# Patient Record
Sex: MALE | Race: WHITE | Employment: OTHER | URBAN - NONMETROPOLITAN AREA
[De-identification: names, ages, dates, MRNs, and addresses within clinical notes are randomized per-mention and may not be internally consistent; named-entity substitution may affect disease eponyms.]

---

## 2017-11-30 ENCOUNTER — OFFICE VISIT (OUTPATIENT)
Dept: PULMONOLOGY | Age: 77
End: 2017-11-30
Payer: MEDICARE

## 2017-11-30 VITALS
OXYGEN SATURATION: 98 % | SYSTOLIC BLOOD PRESSURE: 112 MMHG | HEIGHT: 70 IN | BODY MASS INDEX: 39.03 KG/M2 | HEART RATE: 78 BPM | DIASTOLIC BLOOD PRESSURE: 70 MMHG | WEIGHT: 272.6 LBS

## 2017-11-30 DIAGNOSIS — Z99.89 OBSTRUCTIVE SLEEP APNEA ON CPAP: Primary | ICD-10-CM

## 2017-11-30 DIAGNOSIS — G47.33 OBSTRUCTIVE SLEEP APNEA ON CPAP: Primary | ICD-10-CM

## 2017-11-30 PROCEDURE — 99213 OFFICE O/P EST LOW 20 MIN: CPT | Performed by: PHYSICIAN ASSISTANT

## 2017-11-30 PROCEDURE — 1123F ACP DISCUSS/DSCN MKR DOCD: CPT | Performed by: PHYSICIAN ASSISTANT

## 2017-11-30 PROCEDURE — G8427 DOCREV CUR MEDS BY ELIG CLIN: HCPCS | Performed by: PHYSICIAN ASSISTANT

## 2017-11-30 PROCEDURE — G8484 FLU IMMUNIZE NO ADMIN: HCPCS | Performed by: PHYSICIAN ASSISTANT

## 2017-11-30 PROCEDURE — 4040F PNEUMOC VAC/ADMIN/RCVD: CPT | Performed by: PHYSICIAN ASSISTANT

## 2017-11-30 PROCEDURE — G8417 CALC BMI ABV UP PARAM F/U: HCPCS | Performed by: PHYSICIAN ASSISTANT

## 2017-11-30 PROCEDURE — 1036F TOBACCO NON-USER: CPT | Performed by: PHYSICIAN ASSISTANT

## 2017-11-30 PROCEDURE — 3017F COLORECTAL CA SCREEN DOC REV: CPT | Performed by: PHYSICIAN ASSISTANT

## 2017-11-30 NOTE — PROGRESS NOTES
[place] [facility]      Dina Olvera                                         [id number]  11/30/2017   Chief Complaint   Patient presents with    Sleep Apnea     MARY 1 yr f/u [facility], has constant severe pain and he exhausts him,had a slipped sdisc, nerve pinched, getting a spinal stimulator next week         Pt of Dr. Evelyn Calvillo    PAP Download:   Original or initial  AHI: 67.7     Date of initial study: 5/20/11    [x] Compliant  100%   []  Noncompliant 0 %     PAP Type Auto CPAP   Level  10-20   Avg Hrs/Day 8:39  AHI: 0.6   Recorded compliance dates , October 31, 2017  to November 29, 2017   Machine/Mfg: Respironics Interface: Nasal    Provider:  [x]SR-HME  []Shmuel  []Joanie  []Krystynaare         [][facility] []Other:   Neck Size: 18.25  Mallampati Mallampati 3  ESS:  12    Here is a scan of the most recent download:      Presentation:   Kassie Up presents for sleep medicine follow up for obstructive sleep apnea  Since the last visit, Kassie Up is doing reasonably well with their sleep machine. Other comments: He is doing well with PAP    Equipment issues: The pressure is  acceptable, the mask is acceptable and he  is  using the humidity. Sleep issues:  Do you feel better? Yes and No  More rested? Sometimes   Better concentration? yes    Progress History:   Since last visit any new medical issues? Yes back issues  New ER or hospitlal visits? No  Any new or changes in medicines? No  Any new sleep medicines?  No        Past Medical History:   Diagnosis Date    Arthritis     CAD (coronary artery disease)     Diabetes mellitus ([facility] [address]     Diverticulitis 2001    bowel resection    Hx of blood clots 2010    DVT following foot surgery     Hyperlipidemia     Hypertension     MDRO (multiple drug resistant organisms) resistance 2014    C-DIFF    Sleep apnea        Past Surgical History:   Procedure Laterality Date    ABDOMEN SURGERY  2001, 2014, 2015    bowel resection for diverticulitis    APPENDECTOMY  2001    during bowel resection for diverticulitis    BACK SURGERY  4/8/16    L3-S1 decompression, L3-S1 PSF, L5-S1 TLIF    CARDIAC CATHETERIZATION  2008    stent insertion x1    CARDIAC CATHETERIZATION  2011    normal results    CARPAL TUNNEL RELEASE  Oct. 2012    right hand    CHOLECYSTECTOMY  2003    COLONOSCOPY      last one 2002    FOOT SURGERY  2010    left, fusion    HARDWARE REMOVAL  11/14/2012    Left Heel    HERNIA REPAIR  2004    JOINT REPLACEMENT  Oct. 1994    left knee    JOINT REPLACEMENT  nov. 1994    right knee    LITHOTRIPSY  2000    x2,  right side    ROTATOR CUFF REPAIR  June 2012    left shoulder    SMALL INTESTINE SURGERY      TONSILLECTOMY      age 24 years       Social History   Substance Use Topics    Smoking status: Never Smoker    Smokeless tobacco: Never Used    Alcohol use No       Allergies   Allergen Reactions    Dilaudid [Hydromorphone] Other (See Comments)     Instantly had to lay down and felt awful    Morphine     Nubain [Nalbuphine Hcl] Nausea Only       Current Outpatient Prescriptions   Medication Sig Dispense Refill    lisinopril (PRINIVIL;ZESTRIL) 30 MG tablet Take 5 mg by mouth daily       warfarin (COUMADIN) 5 MG tablet Take 5 mg by mouth daily       MULTIPLE VITAMIN PO Take  by mouth daily.  insulin NPH (HUMULIN N) 100 UNIT/ML injection Inject into the skin nightly Sliding scale      insulin glargine (LANTUS) 100 UNIT/ML injection Inject into the skin every morning sliding scale      insulin regular (HUMULIN R;NOVOLIN R) 100 UNIT/ML injection Inject into the skin daily Sliding scale      potassium citrate (UROCIT-K) 10 MEQ (1080 MG) SR tablet Take 10 mEq by mouth daily       allopurinol (ZYLOPRIM) 300 MG tablet Take 300 mg by mouth daily.  simvastatin (ZOCOR) 40 MG tablet Take 40 mg by mouth nightly.  omeprazole (PRILOSEC) 20 MG capsule Take 20 mg by mouth daily.          No current facility-administered medications for this visit. Family History   Problem Relation Age of Onset    Stroke Mother     High Blood Pressure Mother     Heart Disease Mother     Heart Disease Father     High Blood Pressure Sister     Heart Disease Brother         Review of Systems -   General ROS: gained 20 lbs over  1 year  ENT ROS: negative for - nasal congestion, oral lesions or sore throat  Hematological and Lymphatic ROS: negative  Endocrine ROS: negative  Respiratory ROS: no cough, shortness of breath, or wheezing  Cardiovascular ROS: no chest pain   Gastrointestinal ROS: no abdominal pain, change in bowel habits, or black or bloody stools  Musculoskeletal ROS: negative  Neurological ROS: negative    Physical Exam:    BMI:  Body mass index is 39.11 kg/m². Wt Readings from Last 3 Encounters:   11/30/17 272 lb 9.6 oz (123.7 kg)   11/17/16 255 lb (115.7 kg)   04/08/16 259 lb (117.5 kg)     Vitals: /70 (Site: Right Arm, Position: Sitting)   Pulse 78   Ht 5' 10\" (1.778 m)   Wt 272 lb 9.6 oz (123.7 kg)   SpO2 98%   BMI 39.11 kg/m²       General appearance: alert and oriented to person, place and time, well-developed and well-nourished, in no acute distress  Nose: Nares normal. Septum midline. Mucosa normal. No drainage or sinus tenderness. Oropharynx:  negative  Lungs: clear to auscultation bilaterally  Heart: regular rate and rhythm, S1, S2 normal, no murmur, click, rub or gallop  Extremities: extremities normal, atraumatic, no cyanosis or edema  Neurologic: Mental status: Alert, oriented, thought content appropriate      ASSESSMENT/DIAGNOSIS    1. Obstructive sleep apnea on CPAP              Plan   Do you need any equipment today? Yes update    - He  was advised to continue current positive airway pressure therapy with above described pressure.    - He  advised to keep good compliance with current recommended pressure to get optimal results and clinical improvement  - Recommend 7-9 hours of sleep with PAP  - He was advised to call DME company regarding supplies if needed. - He call my office for earlier appointment if needed for worsening of sleep symptoms.   - He was instructed on weight loss  - Cris Caban was educated about my impression and plan. Patient verbalizes understanding.   We will see Matthew Dukes back in: 1 year with download    Miriam Varghese PA-C, YESENIA  11/30/2017

## 2018-11-29 ENCOUNTER — OFFICE VISIT (OUTPATIENT)
Dept: PULMONOLOGY | Age: 78
End: 2018-11-29
Payer: MEDICARE

## 2018-11-29 VITALS
BODY MASS INDEX: 40.23 KG/M2 | OXYGEN SATURATION: 98 % | HEIGHT: 70 IN | DIASTOLIC BLOOD PRESSURE: 68 MMHG | WEIGHT: 281 LBS | SYSTOLIC BLOOD PRESSURE: 118 MMHG | HEART RATE: 85 BPM

## 2018-11-29 DIAGNOSIS — G47.33 OBSTRUCTIVE SLEEP APNEA ON CPAP: Primary | ICD-10-CM

## 2018-11-29 DIAGNOSIS — Z99.89 OBSTRUCTIVE SLEEP APNEA ON CPAP: Primary | ICD-10-CM

## 2018-11-29 DIAGNOSIS — E66.01 MORBID OBESITY WITH BMI OF 40.0-44.9, ADULT (HCC): ICD-10-CM

## 2018-11-29 PROCEDURE — G8417 CALC BMI ABV UP PARAM F/U: HCPCS | Performed by: PHYSICIAN ASSISTANT

## 2018-11-29 PROCEDURE — 1101F PT FALLS ASSESS-DOCD LE1/YR: CPT | Performed by: PHYSICIAN ASSISTANT

## 2018-11-29 PROCEDURE — 1036F TOBACCO NON-USER: CPT | Performed by: PHYSICIAN ASSISTANT

## 2018-11-29 PROCEDURE — 4040F PNEUMOC VAC/ADMIN/RCVD: CPT | Performed by: PHYSICIAN ASSISTANT

## 2018-11-29 PROCEDURE — 1123F ACP DISCUSS/DSCN MKR DOCD: CPT | Performed by: PHYSICIAN ASSISTANT

## 2018-11-29 PROCEDURE — 99213 OFFICE O/P EST LOW 20 MIN: CPT | Performed by: PHYSICIAN ASSISTANT

## 2018-11-29 PROCEDURE — G8484 FLU IMMUNIZE NO ADMIN: HCPCS | Performed by: PHYSICIAN ASSISTANT

## 2018-11-29 PROCEDURE — G8427 DOCREV CUR MEDS BY ELIG CLIN: HCPCS | Performed by: PHYSICIAN ASSISTANT

## 2018-11-29 ASSESSMENT — ENCOUNTER SYMPTOMS
DIARRHEA: 0
ALLERGIC/IMMUNOLOGIC NEGATIVE: 1
COUGH: 0
SHORTNESS OF BREATH: 0
BACK PAIN: 1
CHEST TIGHTNESS: 0
NAUSEA: 0
WHEEZING: 0
EYES NEGATIVE: 1
STRIDOR: 0

## 2018-11-29 NOTE — PROGRESS NOTES
[place] [facility]      Rocael Flannery         [id number]  11/29/2018   Chief Complaint   Patient presents with    Sleep Apnea     1 year f/u with download [facility]        Pt of Dr. Ale Thompson    PAP Download:   Original or initialAHI: 67.7     Date of initial study: 5/20/11     [x] Compliant  100%   []  Noncompliant 0 %     PAP Type cpapLevel  10/20   Avg Hrs/Day [id number]  AHI: 1.8   Recorded compliance dates , 10/30/18  to 11/28/18   Machine/Mfg: Respironics Interface: nasal    Provider:  [x][facility]ME  [][employer]  [][employer]  [][employer]         [][facility] []Other:   Neck Size: 18.25  Mallampati 3  ESS:  3    Here is a scan of the most recent download:              Presentation:   Liss Mckenna presents for sleep medicine follow up for obstructive sleep apnea  Since the last visit, Liss Mckenna is doing well with PAP. He feels rested. Equipment issues: The pressure is  acceptable, the mask is acceptable and he  is  using the humidity. Sleep issues:  Do you feel better? Yes  More rested? Yes   Better concentration? yes    Progress History:   Since last visit any new medical issues? No  New ER or hospitlal visits? No  Any new or changes in medicines? No  Any new sleep medicines?  No        Past Medical History:   Diagnosis Date    Arthritis     CAD (coronary artery disease)     Diabetes mellitus ([facility] [address]     Diverticulitis 2001    bowel resection    Hx of blood clots 2010    DVT following foot surgery     Hyperlipidemia     Hypertension     MDRO (multiple drug resistant organisms) resistance 2014    C-DIFF    Sleep apnea        Past Surgical History:   Procedure Laterality Date    ABDOMEN SURGERY  2001, 2014, 2015    bowel resection for diverticulitis    APPENDECTOMY  2001    during bowel resection for diverticulitis    BACK SURGERY  4/8/16    L3-S1 decompression, L3-S1 PSF, L5-S1 TLIF    CARDIAC CATHETERIZATION  2008    stent insertion x1    CARDIAC CATHETERIZATION  2011    normal results    CARPAL TUNNEL RELEASE  Oct. 2012    right hand    CHOLECYSTECTOMY  2003    COLONOSCOPY      last one 2002    FOOT SURGERY  2010    left, fusion    HARDWARE REMOVAL  11/14/2012    Left Heel    HERNIA REPAIR  2004    JOINT REPLACEMENT  Oct. 1994    left knee    JOINT REPLACEMENT  nov. 1994    right knee    LITHOTRIPSY  2000    x2,  right side    ROTATOR CUFF REPAIR  June 2012    left shoulder    SMALL INTESTINE SURGERY      TONSILLECTOMY      age 24 years       Social History   Substance Use Topics    Smoking status: Never Smoker    Smokeless tobacco: Never Used    Alcohol use No       Allergies   Allergen Reactions    Dilaudid [Hydromorphone] Other (See Comments)     Instantly had to lay down and felt awful    Morphine     Nubain [Nalbuphine Hcl] Nausea Only       Current Outpatient Prescriptions   Medication Sig Dispense Refill    sertraline (ZOLOFT) 50 MG tablet Take 50 mg by mouth daily      Insulin Zinc Human (NOVOLIN L SC) Inject into the skin      lisinopril (PRINIVIL;ZESTRIL) 30 MG tablet Take 5 mg by mouth daily       warfarin (COUMADIN) 5 MG tablet Take 5 mg by mouth daily       MULTIPLE VITAMIN PO Take  by mouth daily.  insulin glargine (LANTUS) 100 UNIT/ML injection Inject into the skin every morning sliding scale      insulin regular (HUMULIN R;NOVOLIN R) 100 UNIT/ML injection Inject into the skin daily Sliding scale      potassium citrate (UROCIT-K) 10 MEQ (1080 MG) SR tablet Take 10 mEq by mouth daily       allopurinol (ZYLOPRIM) 300 MG tablet Take 300 mg by mouth daily.  simvastatin (ZOCOR) 40 MG tablet Take 40 mg by mouth nightly.  omeprazole (PRILOSEC) 20 MG capsule Take 20 mg by mouth daily.  insulin NPH (HUMULIN N) 100 UNIT/ML injection Inject into the skin nightly Sliding scale       No current facility-administered medications for this visit.         Family History   Problem Relation Age of Onset    Stroke Mother     High Blood

## 2019-12-02 ENCOUNTER — OFFICE VISIT (OUTPATIENT)
Dept: PULMONOLOGY | Age: 79
End: 2019-12-02
Payer: MEDICARE

## 2019-12-02 VITALS
WEIGHT: 278 LBS | SYSTOLIC BLOOD PRESSURE: 128 MMHG | HEIGHT: 68 IN | DIASTOLIC BLOOD PRESSURE: 70 MMHG | HEART RATE: 70 BPM | OXYGEN SATURATION: 96 % | BODY MASS INDEX: 42.13 KG/M2

## 2019-12-02 DIAGNOSIS — E66.01 MORBID OBESITY WITH BMI OF 40.0-44.9, ADULT (HCC): ICD-10-CM

## 2019-12-02 DIAGNOSIS — G47.33 OBSTRUCTIVE SLEEP APNEA ON CPAP: Primary | ICD-10-CM

## 2019-12-02 DIAGNOSIS — Z99.89 OBSTRUCTIVE SLEEP APNEA ON CPAP: Primary | ICD-10-CM

## 2019-12-02 PROCEDURE — 4040F PNEUMOC VAC/ADMIN/RCVD: CPT | Performed by: PHYSICIAN ASSISTANT

## 2019-12-02 PROCEDURE — G8427 DOCREV CUR MEDS BY ELIG CLIN: HCPCS | Performed by: PHYSICIAN ASSISTANT

## 2019-12-02 PROCEDURE — G8417 CALC BMI ABV UP PARAM F/U: HCPCS | Performed by: PHYSICIAN ASSISTANT

## 2019-12-02 PROCEDURE — 1036F TOBACCO NON-USER: CPT | Performed by: PHYSICIAN ASSISTANT

## 2019-12-02 PROCEDURE — 99213 OFFICE O/P EST LOW 20 MIN: CPT | Performed by: PHYSICIAN ASSISTANT

## 2019-12-02 PROCEDURE — 1123F ACP DISCUSS/DSCN MKR DOCD: CPT | Performed by: PHYSICIAN ASSISTANT

## 2019-12-02 PROCEDURE — G8484 FLU IMMUNIZE NO ADMIN: HCPCS | Performed by: PHYSICIAN ASSISTANT

## 2019-12-02 ASSESSMENT — ENCOUNTER SYMPTOMS
WHEEZING: 0
CHEST TIGHTNESS: 0
COUGH: 0
ALLERGIC/IMMUNOLOGIC NEGATIVE: 1
DIARRHEA: 0
SHORTNESS OF BREATH: 0
NAUSEA: 0
STRIDOR: 0
EYES NEGATIVE: 1
BACK PAIN: 1

## 2020-03-11 ENCOUNTER — OFFICE VISIT (OUTPATIENT)
Dept: PULMONOLOGY | Age: 80
End: 2020-03-11
Payer: MEDICARE

## 2020-03-11 VITALS
BODY MASS INDEX: 41.07 KG/M2 | SYSTOLIC BLOOD PRESSURE: 114 MMHG | HEART RATE: 84 BPM | OXYGEN SATURATION: 96 % | WEIGHT: 271 LBS | DIASTOLIC BLOOD PRESSURE: 72 MMHG | HEIGHT: 68 IN

## 2020-03-11 PROCEDURE — 1036F TOBACCO NON-USER: CPT | Performed by: PHYSICIAN ASSISTANT

## 2020-03-11 PROCEDURE — G8417 CALC BMI ABV UP PARAM F/U: HCPCS | Performed by: PHYSICIAN ASSISTANT

## 2020-03-11 PROCEDURE — G8427 DOCREV CUR MEDS BY ELIG CLIN: HCPCS | Performed by: PHYSICIAN ASSISTANT

## 2020-03-11 PROCEDURE — 1123F ACP DISCUSS/DSCN MKR DOCD: CPT | Performed by: PHYSICIAN ASSISTANT

## 2020-03-11 PROCEDURE — 99214 OFFICE O/P EST MOD 30 MIN: CPT | Performed by: PHYSICIAN ASSISTANT

## 2020-03-11 PROCEDURE — G8484 FLU IMMUNIZE NO ADMIN: HCPCS | Performed by: PHYSICIAN ASSISTANT

## 2020-03-11 PROCEDURE — 4040F PNEUMOC VAC/ADMIN/RCVD: CPT | Performed by: PHYSICIAN ASSISTANT

## 2020-03-11 ASSESSMENT — ENCOUNTER SYMPTOMS
CHEST TIGHTNESS: 0
NAUSEA: 0
BACK PAIN: 1
EYES NEGATIVE: 1
STRIDOR: 0
COUGH: 0
DIARRHEA: 0
ALLERGIC/IMMUNOLOGIC NEGATIVE: 1
WHEEZING: 0
SHORTNESS OF BREATH: 0

## 2020-03-11 NOTE — PROGRESS NOTES
APPENDECTOMY  2001    during bowel resection for diverticulitis    BACK SURGERY  4/8/16    L3-S1 decompression, L3-S1 PSF, L5-S1 TLIF    CARDIAC CATHETERIZATION  2008    stent insertion x1    CARDIAC CATHETERIZATION  2011    normal results    CARPAL TUNNEL RELEASE  Oct. 2012    right hand    CHOLECYSTECTOMY  2003    COLONOSCOPY      last one 2002    FOOT SURGERY  2010    left, fusion    HARDWARE REMOVAL  11/14/2012    Left Heel    HERNIA REPAIR  2004    JOINT REPLACEMENT  Oct. 1994    left knee    JOINT REPLACEMENT  nov. 1994    right knee    LITHOTRIPSY  2000    x2,  right side    ROTATOR CUFF REPAIR  June 2012    left shoulder    SMALL INTESTINE SURGERY      TONSILLECTOMY      age 24 years       Social History     Tobacco Use    Smoking status: Never Smoker    Smokeless tobacco: Never Used   Substance Use Topics    Alcohol use: No     Alcohol/week: 0.0 standard drinks    Drug use: No       Allergies   Allergen Reactions    Dilaudid [Hydromorphone] Other (See Comments)     Instantly had to lay down and felt awful    Morphine     Nubain [Nalbuphine Hcl] Nausea Only       Current Outpatient Medications   Medication Sig Dispense Refill    CPAP Machine MISC by Does not apply route Please change CPAP pressure to 14 cm H20. Download in 2 weeks 1 each 0    sertraline (ZOLOFT) 50 MG tablet Take 50 mg by mouth daily      Insulin Zinc Human (NOVOLIN L SC) Inject into the skin      lisinopril (PRINIVIL;ZESTRIL) 30 MG tablet Take 5 mg by mouth daily       warfarin (COUMADIN) 5 MG tablet Take 5 mg by mouth daily       MULTIPLE VITAMIN PO Take  by mouth daily.       insulin NPH (HUMULIN N) 100 UNIT/ML injection Inject into the skin nightly Sliding scale      insulin glargine (LANTUS) 100 UNIT/ML injection Inject into the skin every morning sliding scale      insulin regular (HUMULIN R;NOVOLIN R) 100 UNIT/ML injection Inject into the skin daily Sliding scale      potassium citrate (UROCIT-K) 10 MEQ (1080 MG) SR tablet Take 10 mEq by mouth daily       allopurinol (ZYLOPRIM) 300 MG tablet Take 300 mg by mouth daily.  simvastatin (ZOCOR) 40 MG tablet Take 40 mg by mouth nightly.  omeprazole (PRILOSEC) 20 MG capsule Take 20 mg by mouth daily. No current facility-administered medications for this visit. Family History   Problem Relation Age of Onset    Stroke Mother     High Blood Pressure Mother     Heart Disease Mother     Heart Disease Father     High Blood Pressure Sister     Heart Disease Brother         Review of Systems -   Review of Systems   Constitutional: Negative for activity change, appetite change, chills and fever. HENT: Negative for congestion and postnasal drip. Eyes: Negative. Respiratory: Negative for cough, chest tightness, shortness of breath, wheezing and stridor. Cardiovascular: Negative for chest pain and leg swelling. Gastrointestinal: Negative for diarrhea and nausea. Endocrine: Negative. Genitourinary: Negative. Musculoskeletal: Positive for back pain. Negative for arthralgias. Skin: Negative. Allergic/Immunologic: Negative. Neurological: Negative. Negative for dizziness and light-headedness. Psychiatric/Behavioral: Negative. All other systems reviewed and are negative. Physical Exam:    BMI:  Body mass index is 41.21 kg/m². Wt Readings from Last 3 Encounters:   03/11/20 271 lb (122.9 kg)   12/02/19 278 lb (126.1 kg)   11/29/18 281 lb (127.5 kg)     Weight stable / unchanged  Vitals: /72 (Site: Left Upper Arm, Position: Sitting, Cuff Size: Large Adult)   Pulse 84   Ht 5' 8\" (1.727 m)   Wt 271 lb (122.9 kg)   SpO2 96% Comment: on room air at rest  BMI 41.21 kg/m²       Physical Exam  Constitutional:       Appearance: He is well-developed. HENT:      Head: Normocephalic and atraumatic.       Right Ear: External ear normal.      Left Ear: External ear normal.   Eyes:      Conjunctiva/sclera:

## 2020-03-26 ENCOUNTER — TELEPHONE (OUTPATIENT)
Dept: PULMONOLOGY | Age: 80
End: 2020-03-26

## 2020-07-14 ENCOUNTER — OFFICE VISIT (OUTPATIENT)
Dept: PULMONOLOGY | Age: 80
End: 2020-07-14
Payer: MEDICARE

## 2020-07-14 VITALS
BODY MASS INDEX: 40.14 KG/M2 | HEART RATE: 78 BPM | SYSTOLIC BLOOD PRESSURE: 122 MMHG | TEMPERATURE: 97.6 F | OXYGEN SATURATION: 97 % | WEIGHT: 264 LBS | DIASTOLIC BLOOD PRESSURE: 64 MMHG

## 2020-07-14 PROCEDURE — 1036F TOBACCO NON-USER: CPT | Performed by: PHYSICIAN ASSISTANT

## 2020-07-14 PROCEDURE — 99214 OFFICE O/P EST MOD 30 MIN: CPT | Performed by: PHYSICIAN ASSISTANT

## 2020-07-14 PROCEDURE — 1123F ACP DISCUSS/DSCN MKR DOCD: CPT | Performed by: PHYSICIAN ASSISTANT

## 2020-07-14 PROCEDURE — G8417 CALC BMI ABV UP PARAM F/U: HCPCS | Performed by: PHYSICIAN ASSISTANT

## 2020-07-14 PROCEDURE — G8427 DOCREV CUR MEDS BY ELIG CLIN: HCPCS | Performed by: PHYSICIAN ASSISTANT

## 2020-07-14 PROCEDURE — 4040F PNEUMOC VAC/ADMIN/RCVD: CPT | Performed by: PHYSICIAN ASSISTANT

## 2020-07-14 ASSESSMENT — ENCOUNTER SYMPTOMS
DIARRHEA: 0
CHEST TIGHTNESS: 0
SHORTNESS OF BREATH: 0
WHEEZING: 0
STRIDOR: 0
COUGH: 0
ALLERGIC/IMMUNOLOGIC NEGATIVE: 1
NAUSEA: 0
BACK PAIN: 1
EYES NEGATIVE: 1

## 2020-07-14 NOTE — PROGRESS NOTES
[place] [facility]      Marielle Holland         [id number]  7/14/2020   Chief Complaint   Patient presents with    Follow-up     sleep f/u, last seen- 3/11/20        Pt of Dr. Niesha Dong     PAP Download:   Original or initial AHI: 67.7     Date of initial study: 5/20/11      Compliant  100%     Noncompliant 0 %     PAP Type AirSense 10 AutoSet Level  [id number]    Avg Hrs/Day 8hrs 57min   AHI: 8.1   Recorded compliance dates , 6/7/20-7/6/20  Machine/Mfg: ResMed  Interface: nasal    Provider:    _x_SR-ARMAAN Russo        __ Veda Dumont    __ [address]            __[facility] __[employer]   __[place]:       __Other    Neck Size: 18.25  Mallampati Mallampati 3  ESS:  12      Here is a scan of the most recent download:            Presentation:   Eris Trivedi presents for sleep medicine follow up for obstructive sleep apnea  Since the last visit, Eris Trivedi is doing ok with PAP but AHI is elevated. His wife is complaining that his mask is leaking. He is opening his mouth at night. He sleeps well. He feels rested. Equipment issues: The pressure is  acceptable, the mask is unacceptable     Sleep issues:  Do you feel better? Yes  More rested? Yes   Better concentration? yes    Progress History:   Since last visit any new medical issues? No  New ER or hospitlal visits? No  Any new or changes in medicines? No  Any new sleep medicines?  No        Past Medical History:   Diagnosis Date    Arthritis     CAD (coronary artery disease)     Diabetes mellitus ([facility] [address]     Diverticulitis 2001    bowel resection    Hx of blood clots 2010    DVT following foot surgery     Hyperlipidemia     Hypertension     MDRO (multiple drug resistant organisms) resistance 2014    C-DIFF    Sleep apnea        Past Surgical History:   Procedure Laterality Date    ABDOMEN SURGERY  2001, 2014, 2015    bowel resection for diverticulitis    APPENDECTOMY  2001    during bowel resection for diverticulitis    BACK SURGERY 4/8/16    L3-S1 decompression, L3-S1 PSF, L5-S1 TLIF    CARDIAC CATHETERIZATION  2008    stent insertion x1    CARDIAC CATHETERIZATION  2011    normal results    CARPAL TUNNEL RELEASE  Oct. 2012    right hand    CHOLECYSTECTOMY  2003    COLONOSCOPY      last one 2002    FOOT SURGERY  2010    left, fusion    HARDWARE REMOVAL  11/14/2012    Left Heel    HERNIA REPAIR  2004    JOINT REPLACEMENT  Oct. 1994    left knee    JOINT REPLACEMENT  nov. 1994    right knee    LITHOTRIPSY  2000    x2,  right side    ROTATOR CUFF REPAIR  June 2012    left shoulder    SMALL INTESTINE SURGERY      TONSILLECTOMY      age 24 years       Social History     Tobacco Use    Smoking status: Never Smoker    Smokeless tobacco: Never Used   Substance Use Topics    Alcohol use: No     Alcohol/week: 0.0 standard drinks    Drug use: No       Allergies   Allergen Reactions    Dilaudid [Hydromorphone] Other (See Comments)     Instantly had to lay down and felt awful    Morphine     Nubain [Nalbuphine Hcl] Nausea Only       Current Outpatient Medications   Medication Sig Dispense Refill    CPAP Machine MISC by Does not apply route Please change CPAP pressure to auto 15-20 cm H20. 1 each 0    sertraline (ZOLOFT) 50 MG tablet Take 50 mg by mouth daily      Insulin Zinc Human (NOVOLIN L SC) Inject into the skin      lisinopril (PRINIVIL;ZESTRIL) 30 MG tablet Take 5 mg by mouth daily       warfarin (COUMADIN) 5 MG tablet Take 5 mg by mouth daily       MULTIPLE VITAMIN PO Take  by mouth daily.       insulin NPH (HUMULIN N) 100 UNIT/ML injection Inject into the skin nightly Sliding scale      insulin glargine (LANTUS) 100 UNIT/ML injection Inject into the skin every morning sliding scale      insulin regular (HUMULIN R;NOVOLIN R) 100 UNIT/ML injection Inject into the skin daily Sliding scale      potassium citrate (UROCIT-K) 10 MEQ (1080 MG) SR tablet Take 10 mEq by mouth daily       allopurinol (ZYLOPRIM) 300 MG tablet Musculoskeletal: Normal range of motion and neck supple. Cardiovascular:      Rate and Rhythm: Normal rate and regular rhythm. Heart sounds: Normal heart sounds. Pulmonary:      Effort: Pulmonary effort is normal.      Breath sounds: Normal breath sounds. Musculoskeletal: Normal range of motion. Skin:     General: Skin is warm and dry. Neurological:      Mental Status: He is alert and oriented to person, place, and time. Psychiatric:         Behavior: Behavior normal.         Thought Content: Thought content normal.         Judgment: Judgment normal.           ASSESSMENT/DIAGNOSIS     Diagnosis Orders   1. Obstructive sleep apnea on CPAP     2. Morbid obesity with BMI of 40.0-44.9, adult ([facility] [address]     3. Hypersomnia              Plan   Do you need any equipment today? Yes needs FFM  - Will get mask leak fixed to improve AHI  - Hypersomnia secondary to inactivity  - He  was advised to continue current positive airway pressure therapy with above described pressure. - He  advised to keep good compliance with current recommended pressure to get optimal results and clinical improvement  - Recommend 7-9 hours of sleep with PAP  - He was advised to call [employer] regarding supplies if needed.   -He call my office for earlier appointment if needed for worsening of sleep symptoms.   - He was instructed on weight loss  - Terell Guzmán was educated about my impression and plan. Patient verbalizesunderstanding.   We will see Lashae Rowell back in: 3 months with download    Information added by my medical assistant/LPN was reviewed today         Bruce Grimes PA-C, MPAS  7/14/2020

## 2022-03-30 ENCOUNTER — OFFICE VISIT (OUTPATIENT)
Dept: PULMONOLOGY | Age: 82
End: 2022-03-30
Payer: MEDICARE

## 2022-03-30 VITALS
OXYGEN SATURATION: 96 % | BODY MASS INDEX: 40.19 KG/M2 | HEIGHT: 68 IN | TEMPERATURE: 97.4 F | HEART RATE: 70 BPM | DIASTOLIC BLOOD PRESSURE: 74 MMHG | SYSTOLIC BLOOD PRESSURE: 138 MMHG | WEIGHT: 265.2 LBS

## 2022-03-30 DIAGNOSIS — E66.01 MORBID OBESITY WITH BMI OF 40.0-44.9, ADULT (HCC): ICD-10-CM

## 2022-03-30 DIAGNOSIS — G47.10 HYPERSOMNIA: ICD-10-CM

## 2022-03-30 DIAGNOSIS — C61 PROSTATE CANCER (HCC): ICD-10-CM

## 2022-03-30 DIAGNOSIS — G47.33 OBSTRUCTIVE SLEEP APNEA ON CPAP: Primary | ICD-10-CM

## 2022-03-30 DIAGNOSIS — Z99.89 OBSTRUCTIVE SLEEP APNEA ON CPAP: Primary | ICD-10-CM

## 2022-03-30 DIAGNOSIS — D50.9 IRON DEFICIENCY ANEMIA, UNSPECIFIED IRON DEFICIENCY ANEMIA TYPE: ICD-10-CM

## 2022-03-30 PROCEDURE — G8417 CALC BMI ABV UP PARAM F/U: HCPCS | Performed by: PHYSICIAN ASSISTANT

## 2022-03-30 PROCEDURE — G8427 DOCREV CUR MEDS BY ELIG CLIN: HCPCS | Performed by: PHYSICIAN ASSISTANT

## 2022-03-30 PROCEDURE — 4040F PNEUMOC VAC/ADMIN/RCVD: CPT | Performed by: PHYSICIAN ASSISTANT

## 2022-03-30 PROCEDURE — 99214 OFFICE O/P EST MOD 30 MIN: CPT | Performed by: PHYSICIAN ASSISTANT

## 2022-03-30 PROCEDURE — 1123F ACP DISCUSS/DSCN MKR DOCD: CPT | Performed by: PHYSICIAN ASSISTANT

## 2022-03-30 PROCEDURE — 1036F TOBACCO NON-USER: CPT | Performed by: PHYSICIAN ASSISTANT

## 2022-03-30 PROCEDURE — G8484 FLU IMMUNIZE NO ADMIN: HCPCS | Performed by: PHYSICIAN ASSISTANT

## 2022-03-30 ASSESSMENT — ENCOUNTER SYMPTOMS
DIARRHEA: 0
EYES NEGATIVE: 1
WHEEZING: 0
NAUSEA: 0
ALLERGIC/IMMUNOLOGIC NEGATIVE: 1
SHORTNESS OF BREATH: 0
BACK PAIN: 0
CHEST TIGHTNESS: 0
STRIDOR: 0
COUGH: 0

## 2022-03-30 NOTE — PROGRESS NOTES
[facility]      Kaia Al         [id number]  3/30/2022   Chief Complaint   Patient presents with    Follow-up     20 month follow up needing supplies , havent been seen since 7/2020 with download         Pt of Dr. Socorro JAY Download:   Original or initial AHI: 67.7     Date of initial study: 5/20/2011      Compliant  100%     Noncompliant 0 %     PAP Type Airsense 10 autoset Level  15/20   Avg Hrs/Day 9 hr 27 min  AHI: 6.6   Recorded compliance dates , 2/28/2022  to 3/29/2022   Machine/Mfg:   [x] ResMed    [] Respironics/Dreamstation   Interface:   [] Nasal    [] Nasal pillows   [x] FFM      Provider:      [x] [facility]     [][employer]     [] [employer]    [] Josue Donovan    [] [facility]               [] [employer]      [] [employer]    [] [address]      [] Other    Neck Size: 18.25  Mallampati Mallampati 3  ESS: 17    SAQLI: 43    Here is a scan of the most recent download:          Presentation:   Lee Andersen presents for sleep medicine follow up for obstructive sleep apnea  Since the last visit, Lee Andersen is doing well with PAP . He was last in the office 2 years ago. Last office visit his AHI was elevated and he never followed up. Mask continues to be an issue. Wife complains of mask leak. He has dry mouth. He is tired all the time. Equipment issues: The pressure is  acceptable, the mask is acceptable     Sleep issues:  Do you feel better? No  More rested? No   Better concentration? no    Progress History:   Since last visit any new medical issues? Yes prostate cancer, anemia  New ER or hospital visits? No  Any new or changes in medicines? No  Any new sleep medicines? No    Review of Systems -   Review of Systems   Constitutional: Negative for activity change, appetite change, chills and fever. HENT: Negative for congestion and postnasal drip. Eyes: Negative. Respiratory: Negative for cough, chest tightness, shortness of breath, wheezing and stridor. Cardiovascular: Negative for chest pain and leg swelling. Gastrointestinal: Negative for diarrhea and nausea. Endocrine: Negative. Genitourinary: Negative. Musculoskeletal: Negative. Negative for arthralgias and back pain. Skin: Negative. Allergic/Immunologic: Negative. Neurological: Negative. Negative for dizziness and light-headedness. Psychiatric/Behavioral: Negative. All other systems reviewed and are negative. Physical Exam:    BMI:  Body mass index is 40.32 kg/m². Wt Readings from Last 3 Encounters:   03/30/22 265 lb 3.2 oz (120.3 kg)   07/14/20 264 lb (119.7 kg)   03/11/20 271 lb (122.9 kg)     Weight stable / unchanged  Vitals: /74 (Site: Left Lower Arm, Position: Sitting, Cuff Size: Large Adult)   Pulse 70   Temp 97.4 °F (36.3 °C) (Temporal)   Ht 5' 8\" (1.727 m)   Wt 265 lb 3.2 oz (120.3 kg)   SpO2 96%   BMI 40.32 kg/m²       Physical Exam  Constitutional:       Appearance: Normal appearance. He is normal weight. HENT:      Head: Normocephalic and atraumatic. Right Ear: External ear normal.      Left Ear: External ear normal.      Nose: Nose normal.   Eyes:      Extraocular Movements: Extraocular movements intact. Conjunctiva/sclera: Conjunctivae normal.      Pupils: Pupils are equal, round, and reactive to light. Pulmonary:      Effort: Pulmonary effort is normal.   Musculoskeletal:      Cervical back: Normal range of motion and neck supple. Neurological:      General: No focal deficit present. Mental Status: He is alert and oriented to person, place, and time. Psychiatric:         Attention and Perception: Attention and perception normal.         Mood and Affect: Mood and affect normal.         Speech: Speech normal.         Behavior: Behavior normal. Behavior is cooperative. Thought Content:  Thought content normal.         Cognition and Memory: Cognition normal.         Judgment: Judgment normal. ASSESSMENT/DIAGNOSIS     Diagnosis Orders   1. Obstructive sleep apnea on CPAP  DME Order for CPAP as OP   2. Morbid obesity with BMI of 40.0-44.9, adult ([address]     3. Hypersomnia     4. Iron deficiency anemia, unspecified iron deficiency anemia type     5. Prostate cancer [facility])              Plan   Do you need any equipment today? Yes update supplies  - Hypersomnia related to inactivity and multiple medical issues like anemia, low iron, prostate cancer, kidney issues  - Download reviewed and discussed with patient  - He  was advised to continue current positive airway pressure therapy with above described pressure. - He  advised to keep good compliance with current recommended pressure to get optimal results and clinical improvement  - Recommend 7-9 hours of sleep with PAP  - He was advised to call DME company regarding supplies if needed.   -He call my office for earlier appointment if needed for worsening of sleep symptoms.   - He was instructed on weight loss  - Sharonbenito Con was educated about my impression and plan. Patient verbalizesunderstanding.   We will see Luis Felipe Arteaga back in: 6 months with download    Information added by my medical assistant/LPN was reviewed today          Roslyn Deal PA-C, MPAS  3/30/2022

## 2022-09-26 NOTE — PROGRESS NOTES
[place] [facility]      Luis Espinoza         [id number]  9/27/2022   Chief Complaint   Patient presents with    Follow-up     [facility] 7 month f/u w DL         Pt of Dr. Kamilla Singer    PAP Download:   Original or initial AHI: 67.7     Date of initial study: 5/22/11    Compliant  100%     Noncompliant 0 %     PAP Type Airsense 10 Level  15-20   Avg Hrs/Day 9 hrs 43 mins   AHI: 4.6   Recorded compliance dates , 8/27/22-9/25/22  Machine/Mfg:   [x] ResMed    [] Respironics/Dreamstation   Interface:   [] Nasal    [] Nasal pillows   [x] FFM      Provider:      [x] [facility]     [][employer]     [] [employer]    [] Oni Evans    [] [employer]               [] [employer]      [] [employer]    [] [address]      [] Other    Neck Size: 18.25  Mallampati Mallampati 3  ESS:  10  SAQLI: 66    Here is a scan of the most recent download:            Presentation:   [place] presents for sleep medicine follow up for obstructive sleep apnea  Since the last visit, [place] is doing ok with PAP. Mask still leaking. He tried a new mask and it did not help. He sleeps ok. He is tired during the day. He is not active. Equipment issues: The pressure is  acceptable, the mask is acceptable     Sleep issues:  Do you feel better? Yes  More rested? Yes   Better concentration? yes    Progress History:   Since last visit any new medical issues? Yes Covid  New ER or hospital visits? No  Any new or changes in medicines? No  Any new sleep medicines? No    Review of Systems -   Review of Systems   Constitutional:  Negative for activity change, appetite change, chills and fever. HENT:  Negative for congestion and postnasal drip. Eyes: Negative. Respiratory:  Negative for cough, chest tightness, shortness of breath, wheezing and stridor. Cardiovascular:  Negative for chest pain and leg swelling. Gastrointestinal:  Negative for diarrhea and nausea. Endocrine: Negative. Genitourinary: Negative. Musculoskeletal: Negative. Negative for arthralgias and back pain. Skin: Negative. Allergic/Immunologic: Negative. Neurological: Negative. Negative for dizziness and light-headedness. Psychiatric/Behavioral: Negative. All other systems reviewed and are negative. Physical Exam:    BMI:  Body mass index is 40.91 kg/m². Wt Readings from Last 3 Encounters:   09/27/22 261 lb 3.2 oz (118.5 kg)   03/30/22 265 lb 3.2 oz (120.3 kg)   07/14/20 264 lb (119.7 kg)     Weight stable / unchanged  Vitals: /76 (Site: Left Upper Arm, Position: Sitting, Cuff Size: Medium Adult)   Pulse 81   Temp 97 °F (36.1 °C) (Temporal)   Ht 5' 7\" (1.702 m)   Wt 261 lb 3.2 oz (118.5 kg)   SpO2 97% Comment: on RA  BMI 40.91 kg/m²       Physical Exam  Constitutional:       Appearance: Normal appearance. He is normal weight. HENT:      Head: Normocephalic and atraumatic. Right Ear: External ear normal.      Left Ear: External ear normal.      Nose: Nose normal.   Eyes:      Extraocular Movements: Extraocular movements intact. Conjunctiva/sclera: Conjunctivae normal.      Pupils: Pupils are equal, round, and reactive to light. Pulmonary:      Effort: Pulmonary effort is normal.   Musculoskeletal:      Cervical back: Normal range of motion and neck supple. Neurological:      General: No focal deficit present. Mental Status: He is alert and oriented to person, place, and time. Psychiatric:         Attention and Perception: Attention and perception normal.         Mood and Affect: Mood and affect normal.         Speech: Speech normal.         Behavior: Behavior normal. Behavior is cooperative. Thought Content: Thought content normal.         Cognition and Memory: Cognition normal.         Judgment: Judgment normal.         ASSESSMENT/DIAGNOSIS     Diagnosis Orders   1. Obstructive sleep apnea on CPAP        2. Morbid obesity with BMI of 40.0-44.9, adult ([address]        3. Hypersomnia        4.  Iron deficiency anemia, unspecified iron deficiency anemia type               Plan   Do you need any equipment today? Yes mask fitting  - will try lowering pressure to correct mask leak  - needs to increase activity to improve hypersomnia   - Download reviewed and discussed with patient  - He  was advised to continue current positive airway pressure therapy with above described pressure. - He  advised to keep good compliance with current recommended pressure to get optimal results and clinical improvement  - Recommend 7-9 hours of sleep with PAP  - He was advised to call [employer] company regarding supplies if needed.   -He call my office for earlier appointment if needed for worsening of sleep symptoms.   - He was instructed on weight loss  - Nevin Gipson was educated about my impression and plan. Patient verbalizesunderstanding.   We will see Jose Dates back in: 1 year with download    Information added by my medical assistant/LPN was reviewed today       Jordan Noble, [address] for pulmonary and Sleep Medicine  9/27/2022

## 2022-09-27 ENCOUNTER — OFFICE VISIT (OUTPATIENT)
Dept: PULMONOLOGY | Age: 82
End: 2022-09-27
Payer: MEDICARE

## 2022-09-27 VITALS
TEMPERATURE: 97 F | DIASTOLIC BLOOD PRESSURE: 76 MMHG | HEIGHT: 67 IN | WEIGHT: 261.2 LBS | OXYGEN SATURATION: 97 % | HEART RATE: 81 BPM | BODY MASS INDEX: 41 KG/M2 | SYSTOLIC BLOOD PRESSURE: 122 MMHG

## 2022-09-27 DIAGNOSIS — G47.10 HYPERSOMNIA: ICD-10-CM

## 2022-09-27 DIAGNOSIS — D50.9 IRON DEFICIENCY ANEMIA, UNSPECIFIED IRON DEFICIENCY ANEMIA TYPE: ICD-10-CM

## 2022-09-27 DIAGNOSIS — Z99.89 OBSTRUCTIVE SLEEP APNEA ON CPAP: Primary | ICD-10-CM

## 2022-09-27 DIAGNOSIS — E66.01 MORBID OBESITY WITH BMI OF 40.0-44.9, ADULT (HCC): ICD-10-CM

## 2022-09-27 DIAGNOSIS — G47.33 OBSTRUCTIVE SLEEP APNEA ON CPAP: Primary | ICD-10-CM

## 2022-09-27 PROCEDURE — G8427 DOCREV CUR MEDS BY ELIG CLIN: HCPCS | Performed by: PHYSICIAN ASSISTANT

## 2022-09-27 PROCEDURE — 1036F TOBACCO NON-USER: CPT | Performed by: PHYSICIAN ASSISTANT

## 2022-09-27 PROCEDURE — 99214 OFFICE O/P EST MOD 30 MIN: CPT | Performed by: PHYSICIAN ASSISTANT

## 2022-09-27 PROCEDURE — G8417 CALC BMI ABV UP PARAM F/U: HCPCS | Performed by: PHYSICIAN ASSISTANT

## 2022-09-27 PROCEDURE — 1123F ACP DISCUSS/DSCN MKR DOCD: CPT | Performed by: PHYSICIAN ASSISTANT

## 2022-09-27 ASSESSMENT — ENCOUNTER SYMPTOMS
STRIDOR: 0
SHORTNESS OF BREATH: 0
DIARRHEA: 0
NAUSEA: 0
WHEEZING: 0
CHEST TIGHTNESS: 0
COUGH: 0
EYES NEGATIVE: 1
BACK PAIN: 0
ALLERGIC/IMMUNOLOGIC NEGATIVE: 1

## 2023-09-26 ENCOUNTER — OFFICE VISIT (OUTPATIENT)
Dept: PULMONOLOGY | Age: 83
End: 2023-09-26
Payer: MEDICARE

## 2023-09-26 VITALS
OXYGEN SATURATION: 96 % | TEMPERATURE: 98.2 F | BODY MASS INDEX: 40.84 KG/M2 | DIASTOLIC BLOOD PRESSURE: 60 MMHG | HEIGHT: 67 IN | HEART RATE: 77 BPM | WEIGHT: 260.2 LBS | SYSTOLIC BLOOD PRESSURE: 120 MMHG

## 2023-09-26 DIAGNOSIS — E66.01 MORBID OBESITY WITH BMI OF 40.0-44.9, ADULT (HCC): ICD-10-CM

## 2023-09-26 DIAGNOSIS — G47.33 OBSTRUCTIVE SLEEP APNEA ON CPAP: Primary | ICD-10-CM

## 2023-09-26 DIAGNOSIS — G47.10 HYPERSOMNIA: ICD-10-CM

## 2023-09-26 PROCEDURE — 3074F SYST BP LT 130 MM HG: CPT | Performed by: PHYSICIAN ASSISTANT

## 2023-09-26 PROCEDURE — 1036F TOBACCO NON-USER: CPT | Performed by: PHYSICIAN ASSISTANT

## 2023-09-26 PROCEDURE — G8417 CALC BMI ABV UP PARAM F/U: HCPCS | Performed by: PHYSICIAN ASSISTANT

## 2023-09-26 PROCEDURE — 99213 OFFICE O/P EST LOW 20 MIN: CPT | Performed by: PHYSICIAN ASSISTANT

## 2023-09-26 PROCEDURE — 3078F DIAST BP <80 MM HG: CPT | Performed by: PHYSICIAN ASSISTANT

## 2023-09-26 PROCEDURE — 1123F ACP DISCUSS/DSCN MKR DOCD: CPT | Performed by: PHYSICIAN ASSISTANT

## 2023-09-26 PROCEDURE — G8427 DOCREV CUR MEDS BY ELIG CLIN: HCPCS | Performed by: PHYSICIAN ASSISTANT

## 2023-09-26 RX ORDER — MEMANTINE HYDROCHLORIDE 10 MG/1
1 TABLET ORAL
COMMUNITY

## 2023-09-26 RX ORDER — DONEPEZIL HYDROCHLORIDE 10 MG/1
20 TABLET, FILM COATED ORAL
COMMUNITY
Start: 2023-07-29

## 2023-09-26 ASSESSMENT — ENCOUNTER SYMPTOMS
DIARRHEA: 0
CHEST TIGHTNESS: 0
COUGH: 0
STRIDOR: 0
SHORTNESS OF BREATH: 0
BACK PAIN: 0
ALLERGIC/IMMUNOLOGIC NEGATIVE: 1
EYES NEGATIVE: 1
WHEEZING: 0
NAUSEA: 0

## 2024-09-23 ENCOUNTER — OFFICE VISIT (OUTPATIENT)
Dept: PULMONOLOGY | Age: 84
End: 2024-09-23
Payer: MEDICARE

## 2024-09-23 VITALS
BODY MASS INDEX: 38.92 KG/M2 | DIASTOLIC BLOOD PRESSURE: 64 MMHG | OXYGEN SATURATION: 93 % | TEMPERATURE: 97.5 F | HEIGHT: 67 IN | HEART RATE: 98 BPM | SYSTOLIC BLOOD PRESSURE: 112 MMHG | WEIGHT: 248 LBS

## 2024-09-23 DIAGNOSIS — G47.10 HYPERSOMNIA: ICD-10-CM

## 2024-09-23 DIAGNOSIS — I50.1 LVF (LEFT VENTRICULAR FAILURE) (HCC): ICD-10-CM

## 2024-09-23 DIAGNOSIS — E66.9 OBESITY (BMI 30-39.9): ICD-10-CM

## 2024-09-23 DIAGNOSIS — G47.33 OBSTRUCTIVE SLEEP APNEA ON CPAP: Primary | ICD-10-CM

## 2024-09-23 PROCEDURE — 1036F TOBACCO NON-USER: CPT | Performed by: PHYSICIAN ASSISTANT

## 2024-09-23 PROCEDURE — 1123F ACP DISCUSS/DSCN MKR DOCD: CPT | Performed by: PHYSICIAN ASSISTANT

## 2024-09-23 PROCEDURE — 3078F DIAST BP <80 MM HG: CPT | Performed by: PHYSICIAN ASSISTANT

## 2024-09-23 PROCEDURE — 99213 OFFICE O/P EST LOW 20 MIN: CPT | Performed by: PHYSICIAN ASSISTANT

## 2024-09-23 PROCEDURE — G8417 CALC BMI ABV UP PARAM F/U: HCPCS | Performed by: PHYSICIAN ASSISTANT

## 2024-09-23 PROCEDURE — G8427 DOCREV CUR MEDS BY ELIG CLIN: HCPCS | Performed by: PHYSICIAN ASSISTANT

## 2024-09-23 PROCEDURE — 3074F SYST BP LT 130 MM HG: CPT | Performed by: PHYSICIAN ASSISTANT

## 2024-09-23 ASSESSMENT — ENCOUNTER SYMPTOMS
BACK PAIN: 0
EYES NEGATIVE: 1
COUGH: 0
ALLERGIC/IMMUNOLOGIC NEGATIVE: 1
NAUSEA: 0
STRIDOR: 0
SHORTNESS OF BREATH: 0
CHEST TIGHTNESS: 0
DIARRHEA: 0
WHEEZING: 0

## 2025-05-22 ENCOUNTER — TELEPHONE (OUTPATIENT)
Dept: PULMONOLOGY | Age: 85
End: 2025-05-22

## 2025-05-22 NOTE — TELEPHONE ENCOUNTER
We called and left you a message to reschedule your 9/22/25 appointment with Georgia Roberts as Julisa is no longer practicing at [facility].  We cancelled that appointment and rescheduled you to David KATZ on 9/24/25 at 10:20AM.    Appointment reminder mailed 5/22/25.

## 2025-08-20 RX ORDER — SODIUM CHLORIDE, SODIUM LACTATE, POTASSIUM CHLORIDE, CALCIUM CHLORIDE 600; 310; 30; 20 MG/100ML; MG/100ML; MG/100ML; MG/100ML
INJECTION, SOLUTION INTRAVENOUS CONTINUOUS
Status: DISCONTINUED | OUTPATIENT
Start: 2025-08-20 | End: 2025-08-22 | Stop reason: HOSPADM

## 2025-08-20 RX ORDER — SODIUM CHLORIDE 0.9 % (FLUSH) 0.9 %
5-40 SYRINGE (ML) INJECTION PRN
Status: DISCONTINUED | OUTPATIENT
Start: 2025-08-20 | End: 2025-08-22 | Stop reason: HOSPADM

## 2025-08-20 RX ORDER — SODIUM CHLORIDE 0.9 % (FLUSH) 0.9 %
5-40 SYRINGE (ML) INJECTION EVERY 12 HOURS SCHEDULED
Status: DISCONTINUED | OUTPATIENT
Start: 2025-08-20 | End: 2025-08-22 | Stop reason: HOSPADM

## 2025-08-20 RX ORDER — SODIUM CHLORIDE 9 MG/ML
25 INJECTION, SOLUTION INTRAVENOUS PRN
Status: DISCONTINUED | OUTPATIENT
Start: 2025-08-20 | End: 2025-08-22 | Stop reason: HOSPADM

## 2025-08-22 ENCOUNTER — ANESTHESIA EVENT (OUTPATIENT)
Dept: ENDOSCOPY | Age: 85
End: 2025-08-22
Payer: MEDICARE

## 2025-08-22 ENCOUNTER — HOSPITAL ENCOUNTER (OUTPATIENT)
Age: 85
Setting detail: OUTPATIENT SURGERY
Discharge: HOME | End: 2025-08-22
Attending: INTERNAL MEDICINE | Admitting: INTERNAL MEDICINE
Payer: MEDICARE

## 2025-08-22 ENCOUNTER — APPOINTMENT (OUTPATIENT)
Dept: ENDOSCOPY | Age: 85
End: 2025-08-22
Attending: INTERNAL MEDICINE
Payer: MEDICARE

## 2025-08-22 ENCOUNTER — ANESTHESIA (OUTPATIENT)
Dept: ENDOSCOPY | Age: 85
End: 2025-08-22
Payer: MEDICARE

## 2025-08-22 VITALS
WEIGHT: 230 LBS | HEART RATE: 60 BPM | SYSTOLIC BLOOD PRESSURE: 95 MMHG | TEMPERATURE: 97.3 F | HEIGHT: 67 IN | BODY MASS INDEX: 36.1 KG/M2 | OXYGEN SATURATION: 92 % | DIASTOLIC BLOOD PRESSURE: 69 MMHG | RESPIRATION RATE: 16 BRPM

## 2025-08-22 PROCEDURE — 2580000003 HC RX 258: Performed by: INTERNAL MEDICINE

## 2025-08-22 PROCEDURE — 7100000011 HC PHASE II RECOVERY - ADDTL 15 MIN: Performed by: INTERNAL MEDICINE

## 2025-08-22 PROCEDURE — 3609017700 HC EGD DILATION GASTRIC/DUODENAL STRICTURE: Performed by: INTERNAL MEDICINE

## 2025-08-22 PROCEDURE — 7100000010 HC PHASE II RECOVERY - FIRST 15 MIN: Performed by: INTERNAL MEDICINE

## 2025-08-22 PROCEDURE — 3700000001 HC ADD 15 MINUTES (ANESTHESIA): Performed by: INTERNAL MEDICINE

## 2025-08-22 PROCEDURE — 6360000002 HC RX W HCPCS: Performed by: NURSE ANESTHETIST, CERTIFIED REGISTERED

## 2025-08-22 PROCEDURE — C1769 GUIDE WIRE: HCPCS | Performed by: INTERNAL MEDICINE

## 2025-08-22 PROCEDURE — 3700000000 HC ANESTHESIA ATTENDED CARE: Performed by: INTERNAL MEDICINE

## 2025-08-22 RX ORDER — EPHEDRINE SULFATE 50 MG/ML
INJECTION, SOLUTION INTRAVENOUS
Status: DISCONTINUED | OUTPATIENT
Start: 2025-08-22 | End: 2025-08-22 | Stop reason: SDUPTHER

## 2025-08-22 RX ORDER — PROPOFOL 10 MG/ML
INJECTION, EMULSION INTRAVENOUS
Status: DISCONTINUED | OUTPATIENT
Start: 2025-08-22 | End: 2025-08-22 | Stop reason: SDUPTHER

## 2025-08-22 RX ADMIN — PROPOFOL 10 MG: 10 INJECTION, EMULSION INTRAVENOUS at 15:01

## 2025-08-22 RX ADMIN — SODIUM CHLORIDE 25 ML: 9 INJECTION, SOLUTION INTRAVENOUS at 14:41

## 2025-08-22 RX ADMIN — EPHEDRINE SULFATE 10 MG: 50 INJECTION, SOLUTION INTRAVENOUS at 15:06

## 2025-08-22 RX ADMIN — PROPOFOL 10 MG: 10 INJECTION, EMULSION INTRAVENOUS at 15:05

## 2025-08-22 RX ADMIN — PROPOFOL 10 MG: 10 INJECTION, EMULSION INTRAVENOUS at 15:03

## 2025-08-22 RX ADMIN — SODIUM CHLORIDE: 9 INJECTION, SOLUTION INTRAVENOUS at 14:58

## 2025-08-22 ASSESSMENT — PAIN - FUNCTIONAL ASSESSMENT
PAIN_FUNCTIONAL_ASSESSMENT: 0-10

## 2025-08-22 ASSESSMENT — ENCOUNTER SYMPTOMS: SHORTNESS OF BREATH: 1

## (undated) DEVICE — GUIDEWIRE DIS MARKED SPRINGTIP